# Patient Record
Sex: FEMALE | Race: WHITE | URBAN - METROPOLITAN AREA
[De-identification: names, ages, dates, MRNs, and addresses within clinical notes are randomized per-mention and may not be internally consistent; named-entity substitution may affect disease eponyms.]

---

## 2017-06-06 ENCOUNTER — OFFICE VISIT (OUTPATIENT)
Dept: FAMILY MEDICINE CLINIC | Facility: CLINIC | Age: 22
End: 2017-06-06

## 2017-06-06 VITALS
TEMPERATURE: 98 F | WEIGHT: 123.38 LBS | DIASTOLIC BLOOD PRESSURE: 71 MMHG | HEART RATE: 82 BPM | RESPIRATION RATE: 19 BRPM | SYSTOLIC BLOOD PRESSURE: 121 MMHG | BODY MASS INDEX: 20.07 KG/M2 | HEIGHT: 65.75 IN

## 2017-06-06 DIAGNOSIS — Z00.00 ROUTINE PHYSICAL EXAMINATION: Primary | ICD-10-CM

## 2017-06-06 PROCEDURE — 99395 PREV VISIT EST AGE 18-39: CPT | Performed by: FAMILY MEDICINE

## 2017-06-06 PROCEDURE — 90471 IMMUNIZATION ADMIN: CPT | Performed by: FAMILY MEDICINE

## 2017-06-06 PROCEDURE — 90632 HEPA VACCINE ADULT IM: CPT | Performed by: FAMILY MEDICINE

## 2017-06-06 NOTE — PROGRESS NOTES
HPI:    Patient ID: Willy Wheatley is a 22year old female. HPI    Review of Systems   Constitutional: Negative. Respiratory: Negative. Cardiovascular: Negative. Gastrointestinal: Negative. Skin: Negative. Neurological: Negative.

## 2018-05-18 ENCOUNTER — OFFICE VISIT (OUTPATIENT)
Dept: FAMILY MEDICINE CLINIC | Facility: CLINIC | Age: 23
End: 2018-05-18

## 2018-05-18 ENCOUNTER — LAB ENCOUNTER (OUTPATIENT)
Dept: LAB | Age: 23
End: 2018-05-18
Attending: FAMILY MEDICINE
Payer: COMMERCIAL

## 2018-05-18 VITALS
HEART RATE: 88 BPM | TEMPERATURE: 98 F | BODY MASS INDEX: 20.33 KG/M2 | DIASTOLIC BLOOD PRESSURE: 73 MMHG | SYSTOLIC BLOOD PRESSURE: 110 MMHG | HEIGHT: 65.75 IN | RESPIRATION RATE: 16 BRPM | WEIGHT: 125 LBS

## 2018-05-18 DIAGNOSIS — R53.83 FATIGUE, UNSPECIFIED TYPE: ICD-10-CM

## 2018-05-18 DIAGNOSIS — Z00.00 ANNUAL PHYSICAL EXAM: Primary | ICD-10-CM

## 2018-05-18 DIAGNOSIS — Z00.00 ANNUAL PHYSICAL EXAM: ICD-10-CM

## 2018-05-18 PROCEDURE — 99395 PREV VISIT EST AGE 18-39: CPT | Performed by: FAMILY MEDICINE

## 2018-05-18 PROCEDURE — 87389 HIV-1 AG W/HIV-1&-2 AB AG IA: CPT

## 2018-05-18 PROCEDURE — 85025 COMPLETE CBC W/AUTO DIFF WBC: CPT

## 2018-05-18 PROCEDURE — 86780 TREPONEMA PALLIDUM: CPT

## 2018-05-18 PROCEDURE — 83540 ASSAY OF IRON: CPT

## 2018-05-18 PROCEDURE — 36415 COLL VENOUS BLD VENIPUNCTURE: CPT

## 2018-05-18 PROCEDURE — 82607 VITAMIN B-12: CPT

## 2018-05-18 PROCEDURE — 80061 LIPID PANEL: CPT

## 2018-05-18 PROCEDURE — 80053 COMPREHEN METABOLIC PANEL: CPT

## 2018-05-18 PROCEDURE — 84443 ASSAY THYROID STIM HORMONE: CPT

## 2018-05-18 PROCEDURE — 82728 ASSAY OF FERRITIN: CPT

## 2018-05-18 PROCEDURE — 84466 ASSAY OF TRANSFERRIN: CPT

## 2018-05-18 PROCEDURE — 82306 VITAMIN D 25 HYDROXY: CPT

## 2018-05-18 NOTE — PROGRESS NOTES
HPI:    Sandra Sims is a 25year old female presents to clinic for an annual physical exam.   Patient is vegan, notes some fatigue. Would like to make sure she is not deficient in iron or any vitamins. No other concerns.    Patient's last menstr Mouth/Throat: Uvula is midline, oropharynx is clear and moist and mucous membranes are normal.   Eyes: Conjunctivae and EOM are normal. Pupils are equal, round, and reactive to light. Neck: Normal range of motion. Neck supple. No thyromegaly present.

## (undated) NOTE — MR AVS SNAPSHOT
[facility]  [address], [address] [place] [address]  [phone]               Thank you for choosing us for your health care visit with Saad Perry.  Josey Reynoso MD.  We are glad to serve you and happy to provide you with this summary Summaries. If you've been to the Emergency Department or your doctor's office, you can view your past visit information in [employer] by going to Visits < Visit Summaries. [employer] questions? Call [phone] for help.   [employer] is NOT to be used for urge